# Patient Record
Sex: MALE | Race: WHITE | NOT HISPANIC OR LATINO | Employment: OTHER | URBAN - NONMETROPOLITAN AREA
[De-identification: names, ages, dates, MRNs, and addresses within clinical notes are randomized per-mention and may not be internally consistent; named-entity substitution may affect disease eponyms.]

---

## 2022-09-16 ENCOUNTER — OFFICE VISIT (OUTPATIENT)
Dept: FAMILY MEDICINE | Facility: OTHER | Age: 67
End: 2022-09-16
Attending: PHYSICIAN ASSISTANT
Payer: COMMERCIAL

## 2022-09-16 VITALS
OXYGEN SATURATION: 97 % | TEMPERATURE: 97.1 F | HEART RATE: 74 BPM | RESPIRATION RATE: 16 BRPM | SYSTOLIC BLOOD PRESSURE: 120 MMHG | DIASTOLIC BLOOD PRESSURE: 66 MMHG | WEIGHT: 190.2 LBS

## 2022-09-16 DIAGNOSIS — E11.69 TYPE 2 DIABETES MELLITUS WITH OTHER SPECIFIED COMPLICATION, WITHOUT LONG-TERM CURRENT USE OF INSULIN (H): Primary | ICD-10-CM

## 2022-09-16 LAB
HBA1C MFR BLD: 8.1 % (ref 4–6.2)
HOLD SPECIMEN: NORMAL

## 2022-09-16 PROCEDURE — G0463 HOSPITAL OUTPT CLINIC VISIT: HCPCS

## 2022-09-16 PROCEDURE — 99203 OFFICE O/P NEW LOW 30 MIN: CPT | Performed by: NURSE PRACTITIONER

## 2022-09-16 PROCEDURE — 83036 HEMOGLOBIN GLYCOSYLATED A1C: CPT | Mod: ZL | Performed by: NURSE PRACTITIONER

## 2022-09-16 PROCEDURE — 36415 COLL VENOUS BLD VENIPUNCTURE: CPT | Mod: ZL | Performed by: NURSE PRACTITIONER

## 2022-09-16 RX ORDER — ROSUVASTATIN CALCIUM 10 MG/1
10 TABLET, COATED ORAL DAILY
COMMUNITY
Start: 2022-09-16

## 2022-09-16 RX ORDER — METOPROLOL SUCCINATE 25 MG/1
25 TABLET, EXTENDED RELEASE ORAL DAILY
COMMUNITY
Start: 2022-09-16

## 2022-09-16 RX ORDER — DULAGLUTIDE 0.75 MG/.5ML
0.75 INJECTION, SOLUTION SUBCUTANEOUS
COMMUNITY
Start: 2022-09-16

## 2022-09-16 ASSESSMENT — PAIN SCALES - GENERAL: PAINLEVEL: NO PAIN (0)

## 2022-09-16 NOTE — PATIENT INSTRUCTIONS
Results for orders placed or performed in visit on 09/16/22   Extra Tube     Status: None (In process)    Narrative    The following orders were created for panel order Extra Tube.  Procedure                               Abnormality         Status                     ---------                               -----------         ------                     Extra Serum Separator Tu...[[id number]]                      In process                   Please view results for these tests on the individual orders.   Results for orders placed or performed in visit on 09/16/22   Hemoglobin A1c     Status: Abnormal   Result Value Ref Range    Hemoglobin A1C 8.1 (H) 4.0 - 6.2 %

## 2022-09-16 NOTE — PROGRESS NOTES
ASSESSMENT/PLAN:     I have reviewed the nursing notes.  I have reviewed the findings, diagnosis, plan and need for follow up with the patient.      1. Type 2 diabetes mellitus with other specified complication, without long-term current use of insulin (H)    - dulaglutide (TRULICITY) 0.75 MG/0.5ML pen; Inject 0.75 mg Subcutaneous every 7 days (historic, patient reported)  - sitagliptin-metFORMIN (JANUMET)  MG tablet; Take 1 tablet by mouth 2 times daily (with meals) (historic, patient reported)  - rosuvastatin (CRESTOR) 10 MG tablet; Take 1 tablet (10 mg) by mouth daily (historic, patient reported)  - empagliflozin (JARDIANCE) 10 MG TABS tablet; Take 1 tablet (10 mg) by mouth daily  (historic, patient reported)  - metoprolol succinate ER (TOPROL XL) 25 MG 24 hr tablet; Take 1 tablet (25 mg) by mouth daily  (historic, patient reported)  - Hemoglobin A1c    Patient here in [place] on vacation, due for his 3 month A1C check.    Currently 8.1, reports last was 8.2 about 3 months ago.  Currently on Trulicity, Janumet, Jardiance - continue as prescribed.  Follow up with PCP upon return home to [place] as planned in November.  Follow up sooner if any concerns       I explained my diagnostic considerations and recommendations to the patient, who voiced understanding and agreement with the treatment plan. All questions were answered. We discussed potential side effects of any prescribed or recommended therapies, as well as expectations for response to treatments.    Diane Abdalla NP  [facility] AND [facility]      SUBJECTIVE:   Michael Pereira is a 66 year old male who presents to clinic today for the following health issues:  3 month A1C check    HPI  Currently on vacation in [place] with his spouse, they live in [place].  They plan on returning in approximately 2 months.  Patient is requesting his 3-month A1c check as he is due.  He states his last A1c of 8.2 just over 3 months ago.  Hx of diabetes for the  past 33 years.  Chronic neuropathy in bilateral feet.  Denies any foot ulcers or skin concerns.  Denies any chest pain or cough or shortness of breath.  Denies any vision change or concerns.  Currently on Trulicity, Janumet, Jardiance          No past medical history on file.  No past surgical history on file.  Social History     Tobacco Use     Smoking status: Light Tobacco Smoker     Types: Cigars     Smokeless tobacco: Never Used   Substance Use Topics     Alcohol use: Not on file     No current outpatient medications on file.     Allergies   Allergen Reactions     Sulfa Drugs Rash         Past medical history, past surgical history, current medications and allergies reviewed and accurate to the best of my knowledge.        OBJECTIVE:     /66   Pulse 74   Temp 97.1  F (36.2  C) (Tympanic)   Resp 16   Wt 86.3 kg (190 lb 3.2 oz)   SpO2 97%   There is no height or weight on file to calculate BMI.     Physical Exam  General Appearance: Well appearing adult male, appropriate appearance for age. No acute distress  Respiratory: normal chest wall and respirations.  Normal effort.  Clear to auscultation bilaterally, no wheezing, crackles or rhonchi.  No increased work of breathing.  No cough appreciated.  Cardiac: RRR with no murmurs.  CMS intact to bilateral lower extremities with brisk capillary refill and palpable pedal pulses.  No lower extremity edema.  Musculoskeletal:  Equal movement of bilateral upper extremities.  Equal movement of bilateral lower extremities.  Normal gait.    Dermatological: Bilateral feet inspected with intact skin, no ulcerations or open sores.  Psychological: normal affect, alert, oriented, and pleasant.       Labs:  Results for orders placed or performed in visit on 09/16/22   Extra Tube     Status: None (In process)    Narrative    The following orders were created for panel order Extra Tube.  Procedure                               Abnormality         Status                      ---------                               -----------         ------                     Extra Serum Separator Tu...[[id number]]                      In process                   Please view results for these tests on the individual orders.   Results for orders placed or performed in visit on 09/16/22   Hemoglobin A1c     Status: Abnormal   Result Value Ref Range    Hemoglobin A1C 8.1 (H) 4.0 - 6.2 %

## 2022-09-16 NOTE — NURSING NOTE
Chief Complaint   Patient presents with     Blood Draw     A1C check   Patient presents to clinic today for an A1C check.    Initial /66   Pulse 74   Temp 97.1  F (36.2  C) (Tympanic)   Resp 16   Wt 86.3 kg (190 lb 3.2 oz)   SpO2 97%  There is no height or weight on file to calculate BMI.        Medication Reconciliation: complete    Danielle Garcia LPN   9/16/2022  10:03 AM